# Patient Record
Sex: FEMALE | Race: BLACK OR AFRICAN AMERICAN | Employment: FULL TIME | URBAN - METROPOLITAN AREA
[De-identification: names, ages, dates, MRNs, and addresses within clinical notes are randomized per-mention and may not be internally consistent; named-entity substitution may affect disease eponyms.]

---

## 2017-07-27 ENCOUNTER — HOSPITAL ENCOUNTER (OUTPATIENT)
Dept: ULTRASOUND IMAGING | Age: 27
Discharge: HOME | End: 2017-07-27
Attending: INTERNAL MEDICINE
Payer: COMMERCIAL

## 2017-07-27 ENCOUNTER — ANESTHESIA EVENT (OUTPATIENT)
Dept: ENDOSCOPY | Age: 27
End: 2017-07-27
Payer: COMMERCIAL

## 2017-07-27 DIAGNOSIS — R11.0 NAUSEA: ICD-10-CM

## 2017-07-27 PROCEDURE — 76705 ECHO EXAM OF ABDOMEN: CPT

## 2017-07-27 RX ORDER — HYDROMORPHONE HYDROCHLORIDE 2 MG/ML
0.5 INJECTION, SOLUTION INTRAMUSCULAR; INTRAVENOUS; SUBCUTANEOUS
Status: CANCELLED | OUTPATIENT
Start: 2017-07-27

## 2017-07-27 RX ORDER — OXYCODONE HYDROCHLORIDE 5 MG/1
5 TABLET ORAL
Status: CANCELLED | OUTPATIENT
Start: 2017-07-27

## 2017-07-27 RX ORDER — SODIUM CHLORIDE 0.9 % (FLUSH) 0.9 %
5-10 SYRINGE (ML) INJECTION AS NEEDED
Status: CANCELLED | OUTPATIENT
Start: 2017-07-27

## 2017-07-27 RX ORDER — OXYCODONE AND ACETAMINOPHEN 10; 325 MG/1; MG/1
1 TABLET ORAL AS NEEDED
Status: CANCELLED | OUTPATIENT
Start: 2017-07-27

## 2017-07-28 ENCOUNTER — ANESTHESIA (OUTPATIENT)
Dept: ENDOSCOPY | Age: 27
End: 2017-07-28
Payer: COMMERCIAL

## 2017-07-28 ENCOUNTER — HOSPITAL ENCOUNTER (OUTPATIENT)
Age: 27
Setting detail: OUTPATIENT SURGERY
Discharge: HOME | End: 2017-07-28
Attending: INTERNAL MEDICINE | Admitting: INTERNAL MEDICINE
Payer: COMMERCIAL

## 2017-07-28 VITALS
DIASTOLIC BLOOD PRESSURE: 82 MMHG | HEART RATE: 60 BPM | RESPIRATION RATE: 16 BRPM | OXYGEN SATURATION: 100 % | BODY MASS INDEX: 48.82 KG/M2 | WEIGHT: 293 LBS | SYSTOLIC BLOOD PRESSURE: 128 MMHG | TEMPERATURE: 99.8 F | HEIGHT: 65 IN

## 2017-07-28 PROCEDURE — 76060000031 HC ANESTHESIA FIRST 0.5 HR: Performed by: INTERNAL MEDICINE

## 2017-07-28 PROCEDURE — 77030009426 HC FCPS BIOP ENDOSC BSC -B: Performed by: INTERNAL MEDICINE

## 2017-07-28 PROCEDURE — 74011250636 HC RX REV CODE- 250/636

## 2017-07-28 PROCEDURE — 76040000025: Performed by: INTERNAL MEDICINE

## 2017-07-28 PROCEDURE — 74011250636 HC RX REV CODE- 250/636: Performed by: ANESTHESIOLOGY

## 2017-07-28 PROCEDURE — 88305 TISSUE EXAM BY PATHOLOGIST: CPT | Performed by: INTERNAL MEDICINE

## 2017-07-28 PROCEDURE — 88312 SPECIAL STAINS GROUP 1: CPT | Performed by: INTERNAL MEDICINE

## 2017-07-28 PROCEDURE — 74011000250 HC RX REV CODE- 250

## 2017-07-28 RX ORDER — PROPOFOL 10 MG/ML
INJECTION, EMULSION INTRAVENOUS AS NEEDED
Status: DISCONTINUED | OUTPATIENT
Start: 2017-07-28 | End: 2017-07-28 | Stop reason: HOSPADM

## 2017-07-28 RX ORDER — ONDANSETRON 2 MG/ML
INJECTION INTRAMUSCULAR; INTRAVENOUS AS NEEDED
Status: DISCONTINUED | OUTPATIENT
Start: 2017-07-28 | End: 2017-07-28 | Stop reason: HOSPADM

## 2017-07-28 RX ORDER — PROPOFOL 10 MG/ML
INJECTION, EMULSION INTRAVENOUS
Status: DISCONTINUED | OUTPATIENT
Start: 2017-07-28 | End: 2017-07-28 | Stop reason: HOSPADM

## 2017-07-28 RX ORDER — LIDOCAINE HYDROCHLORIDE 20 MG/ML
INJECTION, SOLUTION EPIDURAL; INFILTRATION; INTRACAUDAL; PERINEURAL AS NEEDED
Status: DISCONTINUED | OUTPATIENT
Start: 2017-07-28 | End: 2017-07-28 | Stop reason: HOSPADM

## 2017-07-28 RX ORDER — SODIUM CHLORIDE, SODIUM LACTATE, POTASSIUM CHLORIDE, CALCIUM CHLORIDE 600; 310; 30; 20 MG/100ML; MG/100ML; MG/100ML; MG/100ML
75 INJECTION, SOLUTION INTRAVENOUS CONTINUOUS
Status: DISCONTINUED | OUTPATIENT
Start: 2017-07-28 | End: 2017-07-28 | Stop reason: HOSPADM

## 2017-07-28 RX ADMIN — ONDANSETRON 4 MG: 2 INJECTION INTRAMUSCULAR; INTRAVENOUS at 08:09

## 2017-07-28 RX ADMIN — LIDOCAINE HYDROCHLORIDE 60 MG: 20 INJECTION, SOLUTION EPIDURAL; INFILTRATION; INTRACAUDAL; PERINEURAL at 07:58

## 2017-07-28 RX ADMIN — PROPOFOL 140 MCG/KG/MIN: 10 INJECTION, EMULSION INTRAVENOUS at 07:58

## 2017-07-28 RX ADMIN — SODIUM CHLORIDE, SODIUM LACTATE, POTASSIUM CHLORIDE, AND CALCIUM CHLORIDE 75 ML/HR: 600; 310; 30; 20 INJECTION, SOLUTION INTRAVENOUS at 07:05

## 2017-07-28 RX ADMIN — PROPOFOL 50 MG: 10 INJECTION, EMULSION INTRAVENOUS at 07:58

## 2017-07-28 NOTE — ANESTHESIA POSTPROCEDURE EVALUATION
Post-Anesthesia Evaluation and Assessment    Patient: Stephania Laughlin MRN: [id number]  SSN: [id number]    YOB: 1990  Age: 24 y.o. Sex: female       Cardiovascular Function/Vital Signs  Visit Vitals    /58    Pulse 78    Temp 37.7 °C (99.8 °F)    Resp 12    Ht 5' 5\" (1.651 m)    Wt 152 kg (335 lb)    SpO2 97%    Breastfeeding No    BMI 55.75 kg/m2       Patient is status post total IV anesthesia anesthesia for Procedure(s):  ESOPHAGOGASTRODUODENOSCOPY (EGD)/ 57  ESOPHAGOGASTRODUODENAL (EGD) BIOPSY. Nausea/Vomiting: None    Postoperative hydration reviewed and adequate. Pain:  Pain Scale 1: Visual (07/28/17 0818)  Pain Intensity 1: 0 (07/28/17 0818)   Managed    Neurological Status: At baseline    Mental Status and Level of Consciousness: Arousable    Pulmonary Status:   O2 Device: Nasal cannula (07/28/17 0819)   Adequate oxygenation and airway patent    Complications related to anesthesia: None    Post-anesthesia assessment completed.  No concerns    Signed By: Erika Degroot MD     July 28, 2017

## 2017-07-28 NOTE — DISCHARGE INSTRUCTIONS
Gastrointestinal Esophagogastroduodenoscopy (EGD) - Upper Exam Discharge Instructions    1. Call Dr. Syd Lowe at [phone] for any problems or questions. 2. Contact the doctor's office for follow up appointment as directed. 3. Medication may cause drowsiness for several hours, therefore, do not drive or  operate machinery for remainder of the day. 4. No alcohol today. 5. Ordinarily, you may resume regular diet and activity after exam unless otherwise  specified by your physician. 6. For mild soreness in your throat you may use Cepacol throat lozenges or warm  salt-water gargles as needed. Any additional instructions:      1. Continue Omeprazole 40 mg a day and try and stop Zantac 150 mg at bedtime. 2. Follow up with Dr. Syd Lowe as needed. Instructions given to Deng Zuniga and other family members.   Instructions given by:  Dale Adan RN

## 2017-07-28 NOTE — H&P
Date of Surgery Update:  Charito Brady was seen and examined. History and physical has been reviewed. The patient has been examined.  There have been no significant clinical changes since the completion of the originally dated History and Physical.  Nausea and emesis    Signed By: Mary Pleitez MD     July 28, 2017 7:51 AM

## 2017-07-28 NOTE — PROGRESS NOTES
Pt discharged via wheelchair by Binh Elias. VSS on room air. Dr. Radha Valdes spoke to pt and family at bedside.

## 2017-07-28 NOTE — PROCEDURES
ESOPHAGOGASTRODUODENOSCOPY    ESOPHAGOGASTRODUODENOSCOPY    DATE of PROCEDURE: 7/28/2017    PT NAME: Stephania Laughlin     [id number]     INDICATION:  N/V, GERD and HB    MEDICATION:   TIVA    INSTRUMENT: GIF  H180    SPECIAL PROCEDURE: None  BLOOD LOSS- 0 or min. SPEC- duodenal, gastric and EG junction    PROCEDURE:  Standard EGD      ASSESSMENT:  1. Mild gastritis    PLAN:   1. Continue Omeprazole 40 mg a day and try and stop Zantac 150 mg at hs. Dietary changes and weight loss to continue--these are helping her sx.  2. F/U prn, unless biopsies indicate otherwise.     Tadeo Calderon MD

## 2017-07-28 NOTE — ANESTHESIA PREPROCEDURE EVALUATION
Anesthetic History   No history of anesthetic complications            Review of Systems / Medical History  Patient summary reviewed and pertinent labs reviewed    Pulmonary  Within defined limits                 Neuro/Psych   Within defined limits           Cardiovascular                  Exercise tolerance: >4 METS     GI/Hepatic/Renal     GERD           Endo/Other        Morbid obesity     Other Findings              Physical Exam    Airway  Mallampati: II  TM Distance: > 6 cm  Neck ROM: normal range of motion   Mouth opening: Normal     Cardiovascular               Dental  No notable dental hx       Pulmonary                 Abdominal         Other Findings            Anesthetic Plan    ASA: 3  Anesthesia type: total IV anesthesia          Induction: Intravenous  Anesthetic plan and risks discussed with: Patient

## 2017-07-28 NOTE — IP AVS SNAPSHOT
[address] [place] [address] 
[phone] Patient: Brittani Solorzano MRN: [id number] MAGALIE:[birth year] You are allergic to the following Allergen Reactions Bactrim (Sulfamethoprim Ds) Hives Sulfa (Sulfonamide Antibiotics) Hives Recent Documentation Height Weight Breastfeeding? BMI OB Status Smoking Status 1.651 m 152 kg No 55.75 kg/m2 Having regular periods Never Smoker Emergency Contacts Name Discharge Info Relation Home Work Mobile Ashley Connor DISCHARGE CAREGIVER [3] Mother [14] [phone] Jt Connor DISCHARGE CAREGIVER [3] Father [15] About your hospitalization You were admitted on:  July 28, 2017 You last received care in the:  [facility] You were discharged on:  July 28, 2017 Unit phone number:  [phone] Why you were hospitalized Your primary diagnosis was:  Not on File Providers Seen During Your Hospitalizations Provider Role Specialty Primary office phone Shahab Lomeli MD Attending Provider Gastroenterology [phone] Your Primary Care Physician (PCP) Primary Care Physician Office Phone Office Fax Bryon Reagan [phone] Follow-up Information None Current Discharge Medication List  
  
ASK your doctor about these medications Dose & Instructions Dispensing Information Comments Morning Noon Evening Bedtime  
 drospirenone-ethinyl estradiol 3-0.02 mg Tab Commonly known as:  Yahaira Steiner (28) Your last dose was: Your next dose is:    
   
   
 Dose:  1 Tab Take 1 Tab by mouth daily. Quantity:  84 Tab Refills:  4 Omeprazole delayed release 20 mg tablet Commonly known as:  PRILOSEC D/R Your last dose was: Your next dose is:    
   
   
 Dose:  20 mg Take 20 mg by mouth daily. Refills:  0 OTHER Your last dose was: Your next dose is:    
   
   
 GI oral unknown name of med Refills:  0  
     
   
   
   
  
 raNITIdine 150 mg tablet Commonly known as:  ZANTAC Your last dose was: Your next dose is:    
   
   
 Dose:  1 Tab Take 1 Tab by mouth two (2) times a day. Refills:  0 Discharge Instructions Gastrointestinal Esophagogastroduodenoscopy (EGD) - Upper Exam Discharge Instructions 1. Call Dr. Panda Jones at [phone] for any problems or questions. 2. Contact the doctor's office for follow up appointment as directed. 3. Medication may cause drowsiness for several hours, therefore, do not drive or  operate machinery for remainder of the day. 4. No alcohol today. 5. Ordinarily, you may resume regular diet and activity after exam unless otherwise  specified by your physician. 6. For mild soreness in your throat you may use Cepacol throat lozenges or warm  salt-water gargles as needed. Any additional instructions: 1. Continue Omeprazole 40 mg a day and try and stop Zantac 150 mg at bedtime. 2. Follow up with Dr. Panda Jones as needed. Instructions given to Stephania Laughlin and other family members. Instructions given by:  Sumi Tripp RN Discharge Orders None Introducing [address]! [employer] [place] [employer]Â® [facility] introduces raksul patient portal. Now you can access parts of your medical record, email your doctor's office, and request medication refills online. 1. In your internet browser, go to [email or web address] 2. Click on the First Time User? Click Here link in the Sign In box. You will see the New Member Sign Up page. 3. Enter your raksul Access Code exactly as it appears below. You will not need to use this code after youve completed the sign-up process. If you do not sign up before the expiration date, you must request a new code. · Transinsight Access Code: [email or web address] Expires: 8/14/2017 10:33 AM 
 
4. Enter the last four digits of your Social Security Number (xxxx) and Date of Birth (mm/dd/yyyy) as indicated and click Submit. You will be taken to the next sign-up page. 5. Create a Transinsight ID. This will be your Transinsight login ID and cannot be changed, so think of one that is secure and easy to remember. 6. Create a Transinsight password. You can change your password at any time. 7. Enter your Password Reset Question and Answer. This can be used at a later time if you forget your password. 8. Enter your e-mail address. You will receive e-mail notification when new information is available in [address] 9. Click Sign Up. You can now view and download portions of your medical record. 10. Click the Download Summary menu link to download a portable copy of your medical information. If you have questions, please visit the Frequently Asked Questions section of the Transinsight website. Remember, Transinsight is NOT to be used for urgent needs. For medical emergencies, dial 911. Now available from your iPhone and Android! General Information Please provide this summary of care documentation to your next provider. Patient Signature:  ____________________________________________________________ Date:  ____________________________________________________________  
  
Jeet Mohamud Provider Signature:  ____________________________________________________________ Date:  ____________________________________________________________

## 2017-09-22 ENCOUNTER — HOSPITAL ENCOUNTER (OUTPATIENT)
Dept: MRI IMAGING | Age: 27
Discharge: HOME | End: 2017-09-22
Attending: INTERNAL MEDICINE
Payer: COMMERCIAL

## 2017-09-22 VITALS — WEIGHT: 283 LBS | BODY MASS INDEX: 47.09 KG/M2

## 2017-09-22 DIAGNOSIS — R93.5 ABNORMAL US (ULTRASOUND) OF ABDOMEN: ICD-10-CM

## 2017-09-22 DIAGNOSIS — R11.0 NAUSEA: ICD-10-CM

## 2017-09-22 PROCEDURE — A9577 INJ MULTIHANCE: HCPCS | Performed by: INTERNAL MEDICINE

## 2017-09-22 PROCEDURE — 74011000258 HC RX REV CODE- 258: Performed by: INTERNAL MEDICINE

## 2017-09-22 PROCEDURE — 74011250636 HC RX REV CODE- 250/636: Performed by: INTERNAL MEDICINE

## 2017-09-22 PROCEDURE — 74183 MRI ABD W/O CNTR FLWD CNTR: CPT

## 2017-09-22 RX ORDER — SODIUM CHLORIDE 0.9 % (FLUSH) 0.9 %
10 SYRINGE (ML) INJECTION
Status: COMPLETED | OUTPATIENT
Start: 2017-09-22 | End: 2017-09-22

## 2017-09-22 RX ADMIN — Medication 10 ML: at 08:17

## 2017-09-22 RX ADMIN — GADOBENATE DIMEGLUMINE 10 ML: 529 INJECTION, SOLUTION INTRAVENOUS at 08:17

## 2017-09-22 RX ADMIN — SODIUM CHLORIDE 100 ML: 900 INJECTION, SOLUTION INTRAVENOUS at 08:17

## 2018-02-07 PROBLEM — E66.01 OBESITY, MORBID (HCC): Status: ACTIVE | Noted: 2018-02-07

## 2019-08-23 PROBLEM — I10 ESSENTIAL HYPERTENSION: Status: ACTIVE | Noted: 2019-08-23

## 2020-05-21 PROBLEM — L30.9 DERMATITIS: Status: ACTIVE | Noted: 2020-05-21

## 2020-05-21 PROBLEM — N76.0 ACUTE VAGINITIS: Status: ACTIVE | Noted: 2020-05-21

## 2022-03-10 PROBLEM — K21.9 GASTROESOPHAGEAL REFLUX DISEASE WITHOUT ESOPHAGITIS: Status: ACTIVE | Noted: 2020-12-02

## 2022-03-10 PROBLEM — G43.909 MIGRAINE: Status: ACTIVE | Noted: 2020-12-02

## 2022-03-10 PROBLEM — R10.2 PELVIC PAIN: Status: ACTIVE | Noted: 2020-12-02

## 2022-03-10 PROBLEM — R03.0 ELEVATED BLOOD PRESSURE READING: Status: ACTIVE | Noted: 2020-12-02

## 2022-03-18 PROBLEM — R03.0 ELEVATED BLOOD PRESSURE READING: Status: ACTIVE | Noted: 2020-12-02

## 2022-03-19 PROBLEM — N76.0 ACUTE VAGINITIS: Status: ACTIVE | Noted: 2020-05-21

## 2022-03-19 PROBLEM — L30.9 DERMATITIS: Status: ACTIVE | Noted: 2020-05-21

## 2022-03-19 PROBLEM — K21.9 GASTROESOPHAGEAL REFLUX DISEASE WITHOUT ESOPHAGITIS: Status: ACTIVE | Noted: 2020-12-02

## 2022-03-19 PROBLEM — R10.2 PELVIC PAIN: Status: ACTIVE | Noted: 2020-12-02

## 2022-03-19 PROBLEM — G43.909 MIGRAINE: Status: ACTIVE | Noted: 2020-12-02

## 2022-03-20 PROBLEM — E66.01 OBESITY, MORBID (HCC): Status: ACTIVE | Noted: 2018-02-07

## 2022-03-20 PROBLEM — I10 ESSENTIAL HYPERTENSION: Status: ACTIVE | Noted: 2019-08-23

## 2022-07-25 ENCOUNTER — OFFICE VISIT (OUTPATIENT)
Dept: OCCUPATIONAL MEDICINE | Age: 32
End: 2022-07-25

## 2022-07-25 DIAGNOSIS — Z20.822 ENCOUNTER FOR SCREENING LABORATORY TESTING FOR COVID-19 VIRUS: Primary | ICD-10-CM

## 2022-07-25 LAB — SARS-COV-2, POC: NORMAL

## 2022-07-25 PROCEDURE — 87426 SARSCOV CORONAVIRUS AG IA: CPT | Performed by: NURSE PRACTITIONER

## 2022-07-25 PROCEDURE — 99212 OFFICE O/P EST SF 10 MIN: CPT | Performed by: NURSE PRACTITIONER

## 2022-07-25 NOTE — PATIENT INSTRUCTIONS
Patient Education        Learning About Coronavirus [phone])  What is coronavirus (COVID-19)? COVID-19 is a disease caused by a type of coronavirus. This illness was firstfound in December 2019. It has since spread worldwide. Coronaviruses are a large group of viruses. They cause the common cold. They also cause more serious illnesses like Middle East respiratory syndrome (MERS) and severe acute respiratory syndrome (SARS). COVID-19 is caused by a novelcoronavirus. That means it's a new type that has not been seen in people before. What are the symptoms? COVID-19 symptoms may include:  Fever. Cough. Trouble breathing. Chills or repeated shaking with chills. Muscle and body aches. Headache. Sore throat. New loss of taste or smell. Vomiting. Diarrhea. In severe cases, COVID-19 can cause pneumonia and make it hard to breathewithout help from a machine. It can cause death. How is it diagnosed? COVID-19 is diagnosed with a viral test. This may also be called a PCR test or antigen test. It looks for evidence of the virus in your breathing passages orlungs (respiratory system). The test is most often done on a sample from the nose, throat, or lungs. It's sometimes done on a sample of saliva. One way a sample is collected is byputting a long swab into the back of your nose. If you have questions about COVID-19 testing, ask your doctor or go to cdc.govto use the COVID-19 Viral Testing Tool. How is it treated? Mild cases of COVID-19 can be treated at home. Serious cases need treatment in the hospital. Treatment may include medicines, plus breathing support such as oxygen therapy or a ventilator. Some people may be placed on their belly tohelp their oxygen levels. Treatments that may help people who have COVID-19 include:  Antiviral medicines. These medicines treat viral infections. Immune-based therapy. These medicines help the immune system fight COVID-19.  Examples include monoclonal where you live. Don't go to school, work, or public areas. Wear a well-fitting mask around other people for a full 10 days. Avoid travel, and stay away from people at high risk for serious illness. Watch for symptoms. If you have been exposed AND either tested positive for COVID-19 in the last 90 days and have recovered or you are up to date on your COVID-19 vaccines:  Talk to your doctor as soon as you can. Your doctor may have you take medicine to help prevent serious illness. Get a COVID-19 test. Wait 5 days after you were last exposed. You may need to be tested more than once. And if your test is positive, follow the instructions below. Wear a well-fitting mask around other people for a full 10 days. Avoid travel and stay away from people at high risk for serious illness. Watch for symptoms. If you tested positive for COVID-19 in the last 90 days and have not recovered, another COVID-19 test may not be needed. If you're sick or test positive for COVID-19:  Talk to your doctor as soon as you can. Your doctor may have you take medicine to help prevent serious illness. Get a COVID-19 test unless you have already been tested. You may need to be tested more than once. Stay home. Leave only if you need to get medical care. If you were seriously ill or if you have a weakened immune system, you may need to isolate for several weeks. For a full 10 days, wear a well-fitting mask whenever you're around other people. Avoid travel and stay away from people at high risk for serious illness. Limit contact with pets and people in your home. If possible, stay in a separate bedroom and use a separate bathroom. Clean and disinfect your home every day. Use household  and disinfectant wipes or sprays. Take special care to clean things that you touch with your hands. How can you self-isolate when you have COVID-19?   If you have COVID-19, there are things you can do to help avoid spreading thevirus to others. Stay home, and avoid contact with other people. Limit contact with people in your home. If possible, stay in a separate bedroom and use a separate bathroom. Wear a well-fitting mask when you are around other people. Avoid contact with pets and other animals. Cover your mouth and nose with a tissue when you cough or sneeze. Then throw it in the trash right away. Wash your hands often, especially after you cough or sneeze. Use soap and water, and scrub for at least 20 seconds. If soap and water aren't available, use an alcohol-based hand . Don't share personal household items. These include bedding, towels, cups and glasses, and eating utensils. [address] in the warmest water allowed for the fabric type, and dry it completely. It's okay to wash other people's laundry with yours. Clean and disinfect your home. Use household  and disinfectant wipes or sprays. If you have questions, visit cdc.gov to check the Quarantine and IsolationCalculator. When should you call for help? Call 911 anytime you think you may need emergency care. For example, call if you have life-threatening symptoms, such as: You have severe trouble breathing. (You can't talk at all.)     You have constant chest pain or pressure. You are severely dizzy or lightheaded. You are confused or can't think clearly. You have pale, gray, or blue-colored skin or lips. You pass out (lose consciousness) or are very hard to wake up. You have loss of balance or trouble walking. You have trouble seeing out of one or both eyes. You have weakness or drooping on one side of the face. You have weakness or numbness in an arm or a leg. You have trouble speaking. You have a severe headache. You have a seizure. Call your doctor now or seek immediate medical care if:    You have moderate trouble breathing. (You can't speak a full sentence.)     You are coughing up blood.      You have signs of low blood pressure. These include feeling lightheaded; being too weak to stand; and having cold, pale, clammy skin. Watch closely for changes in your health, and be sure to contact your doctor if:    Your symptoms get worse. You are not getting better as expected. You have new or worse symptoms of anxiety, depression, nightmares, or flashbacks. Call before you go to the doctor's office. Follow their instructions. And wear a mask. Where can you learn more? Go to [email or web address] org and sign in to your WaveDeck account. Enter C008 in the [employer] box to learn more about \"Learning About Coronavirus (COVID-19). \"     If you do not have an account, please click on the \"Sign Up Now\" link. Current as of: May 28, 2022               Content Version: 13.3  © 5989-8620 Healthwise, Incorporated. Care instructions adapted under license by [facility] ([facility]). If you have questions about a medical condition or this instruction, always ask your healthcare professional. [address] any warranty or liability for your use of this information.

## 2022-07-25 NOTE — PROGRESS NOTES
Rapid COVID testing performed today as requested by patient's employer. Discussed that HR notification of result is necessary for medical surveillance during a pandemic state  in order to complete proper disinfection techniques and contact tracing processes if results are positive, and if results are negative they should remain vigilant in monitoring for symptom development as additional testing may be warranted. Patient consented to testing today (results below) and COVID educational handout provided. RTC as needed for complaints or concerns that may arise.      Results for orders placed or performed in visit on 07/25/22   AMB POC SARS-COV-2   Result Value Ref Range    SARS-COV-2, POC Not-Detected Not Detected       Johana Toro, APRN - CNP

## 2022-08-24 ENCOUNTER — OFFICE VISIT (OUTPATIENT)
Dept: OCCUPATIONAL MEDICINE | Age: 32
End: 2022-08-24

## 2022-08-24 DIAGNOSIS — Z20.822 ENCOUNTER FOR SCREENING LABORATORY TESTING FOR COVID-19 VIRUS: Primary | ICD-10-CM

## 2022-08-24 LAB — SARS-COV-2, POC: NORMAL

## 2022-08-24 PROCEDURE — 99212 OFFICE O/P EST SF 10 MIN: CPT | Performed by: NURSE PRACTITIONER

## 2022-08-24 PROCEDURE — 87426 SARSCOV CORONAVIRUS AG IA: CPT | Performed by: NURSE PRACTITIONER

## 2022-08-24 NOTE — PROGRESS NOTES
Rapid COVID testing performed today as requested by patient's employer. HR notification of result is necessary for medical surveillance during a pandemic state  in order to complete proper disinfection techniques and contact tracing processes if results are positive, and if results are negative they should remain vigilant in monitoring for symptom development as additional testing may be warranted. Patient consented to testing today (results below) and COVID educational handout provided. RTC as needed for complaints or concerns that may arise.      Results for orders placed or performed in visit on 08/24/22   AMB POC SARS-COV-2   Result Value Ref Range    SARS-COV-2, POC Not-Detected Not Detected       Brandie Hankins, APRN - CNP

## 2023-08-23 ENCOUNTER — OFFICE VISIT (OUTPATIENT)
Dept: OBGYN CLINIC | Age: 33
End: 2023-08-23

## 2023-08-23 VITALS
HEIGHT: 65 IN | DIASTOLIC BLOOD PRESSURE: 78 MMHG | WEIGHT: 293 LBS | BODY MASS INDEX: 48.82 KG/M2 | SYSTOLIC BLOOD PRESSURE: 132 MMHG

## 2023-08-23 DIAGNOSIS — N64.9 BREAST LESION: ICD-10-CM

## 2023-08-23 DIAGNOSIS — E66.9 OBESITY (BMI 30.0-34.9): ICD-10-CM

## 2023-08-23 DIAGNOSIS — A59.9 TRICHOMONOSIS: Primary | ICD-10-CM

## 2023-08-23 PROCEDURE — 3078F DIAST BP <80 MM HG: CPT | Performed by: OBSTETRICS & GYNECOLOGY

## 2023-08-23 PROCEDURE — 3075F SYST BP GE 130 - 139MM HG: CPT | Performed by: OBSTETRICS & GYNECOLOGY

## 2023-08-23 PROCEDURE — 99213 OFFICE O/P EST LOW 20 MIN: CPT | Performed by: OBSTETRICS & GYNECOLOGY

## 2023-08-23 RX ORDER — LOSARTAN POTASSIUM AND HYDROCHLOROTHIAZIDE 12.5; 5 MG/1; MG/1
1 TABLET ORAL DAILY
COMMUNITY
Start: 2023-07-07

## 2023-08-23 RX ORDER — PHENTERMINE HYDROCHLORIDE 37.5 MG/1
37.5 TABLET ORAL DAILY
COMMUNITY
Start: 2023-08-04

## 2023-08-23 RX ORDER — MELOXICAM 15 MG/1
TABLET ORAL
COMMUNITY
Start: 2023-06-18

## 2023-08-23 ASSESSMENT — ENCOUNTER SYMPTOMS
ALLERGIC/IMMUNOLOGIC NEGATIVE: 1
EYES NEGATIVE: 1
RESPIRATORY NEGATIVE: 1
GASTROINTESTINAL NEGATIVE: 1

## 2023-08-25 LAB
A VAGINAE DNA VAG QL NAA+PROBE: NORMAL SCORE
BVAB2 DNA VAG QL NAA+PROBE: NORMAL SCORE
C ALBICANS DNA VAG QL NAA+PROBE: NEGATIVE
C GLABRATA DNA VAG QL NAA+PROBE: NEGATIVE
C TRACH RRNA SPEC QL NAA+PROBE: NEGATIVE
MEGA1 DNA VAG QL NAA+PROBE: NORMAL SCORE
N GONORRHOEA RRNA SPEC QL NAA+PROBE: NEGATIVE
SPECIMEN SOURCE: NORMAL
T VAGINALIS RRNA SPEC QL NAA+PROBE: NEGATIVE

## 2023-10-05 ENCOUNTER — OFFICE VISIT (OUTPATIENT)
Dept: OCCUPATIONAL MEDICINE | Age: 33
End: 2023-10-05

## 2023-10-05 VITALS
SYSTOLIC BLOOD PRESSURE: 124 MMHG | HEART RATE: 98 BPM | WEIGHT: 293 LBS | BODY MASS INDEX: 48.82 KG/M2 | DIASTOLIC BLOOD PRESSURE: 90 MMHG | HEIGHT: 65 IN

## 2023-10-05 DIAGNOSIS — Z00.8 ENCOUNTER FOR BIOMETRIC SCREENING: Primary | ICD-10-CM

## 2023-10-05 PROBLEM — N76.0 ACUTE VAGINITIS: Status: RESOLVED | Noted: 2020-05-21 | Resolved: 2023-10-05

## 2023-10-05 PROBLEM — G43.701 CHRONIC MIGRAINE WITHOUT AURA WITH STATUS MIGRAINOSUS, NOT INTRACTABLE: Status: ACTIVE | Noted: 2023-06-23

## 2023-10-05 PROBLEM — J30.1 SEASONAL ALLERGIC RHINITIS DUE TO POLLEN: Status: ACTIVE | Noted: 2023-06-23

## 2023-10-05 PROBLEM — I10 ESSENTIAL HYPERTENSION: Status: ACTIVE | Noted: 2019-08-23

## 2023-10-05 PROBLEM — G43.909 MIGRAINE: Status: ACTIVE | Noted: 2020-12-02

## 2023-10-05 PROBLEM — R03.0 ELEVATED BLOOD PRESSURE READING: Status: ACTIVE | Noted: 2020-12-02

## 2023-10-05 PROBLEM — R10.2 PELVIC PAIN: Status: ACTIVE | Noted: 2020-12-02

## 2023-10-05 PROBLEM — L30.9 DERMATITIS: Status: ACTIVE | Noted: 2020-05-21

## 2023-10-05 PROBLEM — E66.01 MORBID OBESITY (HCC): Status: ACTIVE | Noted: 2018-02-07

## 2023-10-06 LAB
CHOLEST SERPL-MCNC: 156 MG/DL (ref 100–199)
CHOLEST/HDLC SERPL: 3.8 RATIO (ref 0–4.4)
GLUCOSE SERPL-MCNC: 78 MG/DL (ref 70–99)
HBA1C MFR BLD: 5.1 % (ref 4.8–5.6)
HDLC SERPL-MCNC: 41 MG/DL
LDLC SERPL CALC-MCNC: 103 MG/DL (ref 0–99)
TRIGL SERPL-MCNC: 57 MG/DL (ref 0–149)
VLDLC SERPL CALC-MCNC: 12 MG/DL (ref 5–40)

## 2024-02-27 ENCOUNTER — OFFICE VISIT (OUTPATIENT)
Age: 34
End: 2024-02-27

## 2024-02-27 VITALS — WEIGHT: 293 LBS | BODY MASS INDEX: 50.09 KG/M2

## 2024-02-27 DIAGNOSIS — L30.9 DERMATITIS: Primary | ICD-10-CM

## 2024-02-27 ASSESSMENT — ENCOUNTER SYMPTOMS
SORE THROAT: 0
EYE PAIN: 0
RHINORRHEA: 0
COUGH: 0
DIARRHEA: 0
VOMITING: 0
SHORTNESS OF BREATH: 0
SINUS PRESSURE: 0
NAIL CHANGES: 0
CHEST TIGHTNESS: 0
EYE REDNESS: 0
NAUSEA: 0
SINUS PAIN: 0
EYE ITCHING: 0
EYE DISCHARGE: 0

## 2024-02-27 NOTE — PROGRESS NOTES
PROGRESS NOTE    SUBJECTIVE:   Ewa Sutherland is a 33 y.o. female seen for recurrent rash to the backs of her hands and wrist for past 2-3 weeks. Reports she has used OTC benadryl and her triamcinolone for her eczema which does improve the rash.    Chief Complaint    Rash         Rash  This is a recurrent problem. The current episode started 1 to 4 weeks ago. The problem has been waxing and waning since onset. The affected locations include the left hand, left wrist, right hand and right wrist. The rash is characterized by itchiness. 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 Pertinent negatives include no anorexia, congestion, cough, diarrhea, eye pain, facial edema, fatigue, fever, joint pain, nail changes, rhinorrhea, shortness of breath, sore throat or vomiting. Past treatments include topical steroids and antihistamine. The treatment provided moderate relief. Her past medical history is significant for allergies and eczema.       Current Outpatient Medications   Medication Sig Dispense Refill    meloxicam (MOBIC) 15 MG tablet       losartan-hydroCHLOROthiazide (HYZAAR) 50-12.5 MG per tablet Take 1 tablet by mouth daily      phentermine (ADIPEX-P) 37.5 MG tablet Take 1 tablet by mouth daily.      ibuprofen (ADVIL;MOTRIN) 800 MG tablet Take 1 tablet by mouth every 6 hours as needed      omeprazole (PRILOSEC) 40 MG delayed release capsule Take 1 capsule by mouth daily       No current facility-administered medications for this visit.      Allergies   Allergen Reactions    Sulfa Antibiotics Hives       Social History     Tobacco Use    Smoking status: Never    Smokeless tobacco: Never   Vaping Use    Vaping Use: Never used   Substance Use Topics    Alcohol use: Yes     Comment: social    Drug use: No        Review of Systems   Constitutional:  Negative for chills, fatigue and fever.   HENT:  Negative for congestion, ear pain, postnasal

## 2024-03-15 ENCOUNTER — OFFICE VISIT (OUTPATIENT)
Dept: OBGYN CLINIC | Age: 34
End: 2024-03-15
Payer: COMMERCIAL

## 2024-03-15 VITALS
BODY MASS INDEX: 48.48 KG/M2 | WEIGHT: 291 LBS | HEIGHT: 65 IN | DIASTOLIC BLOOD PRESSURE: 90 MMHG | SYSTOLIC BLOOD PRESSURE: 134 MMHG

## 2024-03-15 DIAGNOSIS — N94.9 VAGINAL DISCOMFORT: Primary | ICD-10-CM

## 2024-03-15 DIAGNOSIS — B96.89 BACTERIAL VAGINOSIS: ICD-10-CM

## 2024-03-15 DIAGNOSIS — Z11.3 SCREENING FOR STDS (SEXUALLY TRANSMITTED DISEASES): ICD-10-CM

## 2024-03-15 DIAGNOSIS — Z11.8 SCREENING EXAMINATION FOR PARASITIC INFECTION: ICD-10-CM

## 2024-03-15 DIAGNOSIS — N89.8 VAGINAL IRRITATION: ICD-10-CM

## 2024-03-15 DIAGNOSIS — N76.0 BACTERIAL VAGINOSIS: ICD-10-CM

## 2024-03-15 LAB
BILIRUBIN, URINE, POC: NORMAL
BLOOD URINE, POC: NORMAL
GLUCOSE URINE, POC: NORMAL
KETONES, URINE, POC: NORMAL
LEUKOCYTE ESTERASE, URINE, POC: NORMAL
NITRITE, URINE, POC: NEGATIVE
PH, URINE, POC: 7 (ref 4.6–8)
PROTEIN,URINE, POC: NORMAL
SPECIFIC GRAVITY, URINE, POC: 1.01 (ref 1–1.03)
URINALYSIS CLARITY, POC: CLEAR
URINALYSIS COLOR, POC: YELLOW
UROBILINOGEN, POC: NORMAL

## 2024-03-15 PROCEDURE — 99459 PELVIC EXAMINATION: CPT | Performed by: NURSE PRACTITIONER

## 2024-03-15 PROCEDURE — 81001 URINALYSIS AUTO W/SCOPE: CPT | Performed by: NURSE PRACTITIONER

## 2024-03-15 PROCEDURE — 99214 OFFICE O/P EST MOD 30 MIN: CPT | Performed by: NURSE PRACTITIONER

## 2024-03-15 PROCEDURE — 3075F SYST BP GE 130 - 139MM HG: CPT | Performed by: NURSE PRACTITIONER

## 2024-03-15 PROCEDURE — 3080F DIAST BP >= 90 MM HG: CPT | Performed by: NURSE PRACTITIONER

## 2024-03-15 RX ORDER — PREDNISONE 20 MG/1
TABLET ORAL
COMMUNITY
Start: 2024-02-27

## 2024-03-15 RX ORDER — METRONIDAZOLE 500 MG/1
500 TABLET ORAL 2 TIMES DAILY
Qty: 14 TABLET | Refills: 0 | Status: SHIPPED | OUTPATIENT
Start: 2024-03-15 | End: 2024-03-22

## 2024-03-15 NOTE — PROGRESS NOTES
management. BP reading today: 134/90. She denies any HA, CP or SOB. Strict precautions discussed with patient and she is aware when to seek emergent care if needed. Will continue to follow up with pcp for management.      Diagnosis Orders   1. Vaginal discomfort  AMB POC URINALYSIS DIP STICK AUTO W/ MICRO    Nuswab Vaginitis Plus (VG+)    Culture, Urine    Culture, Urine    Nuswab Vaginitis Plus (VG+)      2. Vaginal irritation  AMB POC URINALYSIS DIP STICK AUTO W/ MICRO    Nuswab Vaginitis Plus (VG+)    Culture, Urine    Culture, Urine    Nuswab Vaginitis Plus (VG+)      3. Screening for STDs (sexually transmitted diseases)  Nuswab Vaginitis Plus (VG+)    Nuswab Vaginitis Plus (VG+)      4. Screening examination for parasitic infection  Nuswab Vaginitis Plus (VG+)    Nuswab Vaginitis Plus (VG+)      5. Bacterial vaginosis  metroNIDAZOLE (FLAGYL) 500 MG tablet          No problem-specific Assessment & Plan notes found for this encounter.         Orders Placed This Encounter   Procedures    Nuswab Vaginitis Plus (VG+)     Standing Status:   Future     Number of Occurrences:   1     Standing Expiration Date:   3/15/2025    Culture, Urine     Standing Status:   Future     Number of Occurrences:   1     Standing Expiration Date:   3/15/2025     Order Specific Question:   Specify (ex-cath, midstream, cysto, etc)?     Answer:   midstream    AMB POC URINALYSIS DIP STICK AUTO W/ MICRO           EVONNE David NP

## 2024-03-17 LAB
BACTERIA SPEC CULT: ABNORMAL
SERVICE CMNT-IMP: ABNORMAL

## 2024-05-03 ENCOUNTER — OFFICE VISIT (OUTPATIENT)
Dept: OBGYN CLINIC | Age: 34
End: 2024-05-03

## 2024-05-03 VITALS
BODY MASS INDEX: 47.98 KG/M2 | SYSTOLIC BLOOD PRESSURE: 132 MMHG | HEIGHT: 65 IN | DIASTOLIC BLOOD PRESSURE: 90 MMHG | WEIGHT: 288 LBS

## 2024-05-03 DIAGNOSIS — N94.9 VAGINAL DISCOMFORT: Primary | ICD-10-CM

## 2024-05-03 DIAGNOSIS — N89.8 VAGINAL IRRITATION: ICD-10-CM

## 2024-05-03 DIAGNOSIS — R10.30 LOWER ABDOMINAL PAIN: ICD-10-CM

## 2024-05-03 NOTE — PROGRESS NOTES
the Dove bar soap and noticed that did decrease symptoms.\" States instead of feeling discomfort daily, she now experiences every other day. She also reports experiencing continuing bilateral lower abdominal cramping since February. She reports sitting down for long periods of time will intensify abdominal cramping and lying down does assist cramping. She reports feeling occasional sharp pains to abdomen however, \"these have been random and I have maybe felt 3 times since February.\" Patient states she did start taking Culturelle probiotic over the last few weeks which did assist cramping \"some.\" She denies fevers, chills,constipation, abnormal vaginal discharge, itching, odor, urinary frequency, urgency or dysuria today.    -Offered Nuswab test again today however, patient declined.    -External pelvic exam findings: No lesions or abnormalities noted on exam today.    -Discussed exam findings with patient today with no concerns for abnormalities to vagina on exam.    -Had lengthy discussion with patient that feeling to entrance of vaginal could be due to sensitivity as when patient changed from body wash to bar soap, the symptoms improved. Also recommended ensuring any sexual devices she may use be cleaned properly prior to each use and condoms if used be latex free to decrease possible irritation. Also discussed daily lower abdominal cramping with recommendations of obtaining TVUS to ensure no abnormal findings seen. Will have her see Dr. Howell after ultrasound collection to discuss. She is agreeable with this.    -Will plan to RTO for TVUS and f/u findings with Dr. Howell.     Healthy vulval hygiene practices:       AVOID:    Pantyhose    Synthetic underwear   Jeans and other tight pants   Swimsuits, leotards, thongs, lycra garments   Panty liners   Scented soaps or shampoos   Bubble bath   Scented detergents   Washcloths   Baby wipes or flushable wipes   Feminine sprays, douches, powders   Dyed toilet articles

## 2024-05-28 ENCOUNTER — PATIENT MESSAGE (OUTPATIENT)
Dept: OBGYN CLINIC | Age: 34
End: 2024-05-28

## 2024-05-29 ENCOUNTER — OFFICE VISIT (OUTPATIENT)
Dept: OBGYN CLINIC | Age: 34
End: 2024-05-29
Payer: COMMERCIAL

## 2024-05-29 ENCOUNTER — PROCEDURE VISIT (OUTPATIENT)
Dept: OBGYN CLINIC | Age: 34
End: 2024-05-29
Payer: COMMERCIAL

## 2024-05-29 VITALS
BODY MASS INDEX: 48.32 KG/M2 | WEIGHT: 290 LBS | HEIGHT: 65 IN | SYSTOLIC BLOOD PRESSURE: 126 MMHG | DIASTOLIC BLOOD PRESSURE: 88 MMHG

## 2024-05-29 DIAGNOSIS — N83.8 OVARIAN MASS, LEFT: Primary | ICD-10-CM

## 2024-05-29 DIAGNOSIS — N94.9 VAGINAL DISCOMFORT: Primary | ICD-10-CM

## 2024-05-29 PROCEDURE — 99213 OFFICE O/P EST LOW 20 MIN: CPT | Performed by: OBSTETRICS & GYNECOLOGY

## 2024-05-29 PROCEDURE — 76830 TRANSVAGINAL US NON-OB: CPT | Performed by: OBSTETRICS & GYNECOLOGY

## 2024-05-29 PROCEDURE — 3074F SYST BP LT 130 MM HG: CPT | Performed by: OBSTETRICS & GYNECOLOGY

## 2024-05-29 PROCEDURE — 3079F DIAST BP 80-89 MM HG: CPT | Performed by: OBSTETRICS & GYNECOLOGY

## 2024-05-29 ASSESSMENT — ENCOUNTER SYMPTOMS
ALLERGIC/IMMUNOLOGIC NEGATIVE: 1
EYES NEGATIVE: 1
GASTROINTESTINAL NEGATIVE: 1
RESPIRATORY NEGATIVE: 1

## 2024-05-29 NOTE — PROGRESS NOTES
Name: Ewa Sutherland  Date: 2024  YOB: 1990  LMP: Patient's last menstrual period was 2024 (exact date).      Ewa is a 33 y.o.   who is here for a problem visit for pelvic pain/US . Pain resolved    Ewa  reports being sexually active and has had partner(s) who are male. She reports using the following method of birth control/protection: Condom.     Review of Systems   Constitutional: Negative.    HENT: Negative.     Eyes: Negative.    Respiratory: Negative.     Cardiovascular: Negative.    Gastrointestinal: Negative.    Endocrine: Negative.    Genitourinary: Negative.    Musculoskeletal: Negative.    Skin: Negative.    Allergic/Immunologic: Negative.    Hematological: Negative.    Psychiatric/Behavioral: Negative.  Negative for self-injury and suicidal ideas.        Menstrual status: regular cycles  Gyn Surgery: none    Pap History:  Diagnosis:   Date Value Ref Range Status   2019 Comment  Final     Comment:     NEGATIVE FOR INTRAEPITHELIAL LESION OR MALIGNANCY.   2018 Comment  Final     Comment:     NEGATIVE FOR INTRAEPITHELIAL LESION AND MALIGNANCY.   2018 Comment  Final     Comment:     NEGATIVE FOR INTRAEPITHELIAL LESION AND MALIGNANCY.  THIS SPECIMEN WAS RESCREENED AS PART OF OUR  PROGRAM.          No specialty comments available.    Past Medical History:  No date: Abnormal Papanicolaou smear of cervix  No date: Anemia  No date: GERD (gastroesophageal reflux disease)      Comment:  severe  No date: Morbid obesity (HCC)     Past Surgical History:  No date: [place] UNLISTED PROCEDURE ABDOMEN PERITONEUM & OMENTUM      Comment:  gallbladder rx  No date: WISDOM TOOTH EXTRACTION       Current Outpatient Medications:     meloxicam (MOBIC) 15 MG tablet, , Disp: , Rfl:     losartan-hydroCHLOROthiazide (HYZAAR) 50-12.5 MG per tablet, Take 1 tablet by mouth daily, Disp: , Rfl:     phentermine (ADIPEX-P) 37.5 MG tablet, Take 1 tablet by mouth daily., Disp: ,  eyeglasses

## 2024-08-07 ENCOUNTER — OFFICE VISIT (OUTPATIENT)
Dept: OBGYN CLINIC | Age: 34
End: 2024-08-07
Payer: COMMERCIAL

## 2024-08-07 ENCOUNTER — PROCEDURE VISIT (OUTPATIENT)
Dept: OBGYN CLINIC | Age: 34
End: 2024-08-07
Payer: COMMERCIAL

## 2024-08-07 VITALS — BODY MASS INDEX: 48.84 KG/M2 | WEIGHT: 289 LBS | DIASTOLIC BLOOD PRESSURE: 82 MMHG | SYSTOLIC BLOOD PRESSURE: 140 MMHG

## 2024-08-07 DIAGNOSIS — E66.01 MORBID OBESITY (HCC): ICD-10-CM

## 2024-08-07 DIAGNOSIS — N83.8 OVARIAN MASS, LEFT: Primary | ICD-10-CM

## 2024-08-07 DIAGNOSIS — R93.5 ABNORMAL ULTRASOUND OF OVARY: Primary | ICD-10-CM

## 2024-08-07 DIAGNOSIS — E66.9 OBESITY (BMI 30.0-34.9): ICD-10-CM

## 2024-08-07 PROCEDURE — 3079F DIAST BP 80-89 MM HG: CPT | Performed by: OBSTETRICS & GYNECOLOGY

## 2024-08-07 PROCEDURE — 99213 OFFICE O/P EST LOW 20 MIN: CPT | Performed by: OBSTETRICS & GYNECOLOGY

## 2024-08-07 PROCEDURE — 3077F SYST BP >= 140 MM HG: CPT | Performed by: OBSTETRICS & GYNECOLOGY

## 2024-08-07 PROCEDURE — 76830 TRANSVAGINAL US NON-OB: CPT | Performed by: OBSTETRICS & GYNECOLOGY

## 2024-08-07 ASSESSMENT — ENCOUNTER SYMPTOMS
GASTROINTESTINAL NEGATIVE: 1
RESPIRATORY NEGATIVE: 1
EYES NEGATIVE: 1
ALLERGIC/IMMUNOLOGIC NEGATIVE: 1

## 2024-08-13 ENCOUNTER — OFFICE VISIT (OUTPATIENT)
Age: 34
End: 2024-08-13

## 2024-08-13 VITALS
OXYGEN SATURATION: 98 % | RESPIRATION RATE: 16 BRPM | SYSTOLIC BLOOD PRESSURE: 124 MMHG | HEART RATE: 87 BPM | DIASTOLIC BLOOD PRESSURE: 88 MMHG | TEMPERATURE: 98.6 F

## 2024-08-13 DIAGNOSIS — J02.0 STREP PHARYNGITIS: Primary | ICD-10-CM

## 2024-08-13 DIAGNOSIS — J02.9 SORE THROAT: ICD-10-CM

## 2024-08-13 LAB
GROUP A STREP ANTIGEN, POC: POSITIVE
INFLUENZA A ANTIGEN, POC: NEGATIVE
INFLUENZA B ANTIGEN, POC: NEGATIVE
SARS-COV-2 RNA, POC: NEGATIVE
VALID INTERNAL CONTROL, POC: YES

## 2024-08-13 RX ORDER — AMOXICILLIN 500 MG/1
500 CAPSULE ORAL 2 TIMES DAILY
Qty: 20 CAPSULE | Refills: 0 | Status: SHIPPED | OUTPATIENT
Start: 2024-08-13 | End: 2024-08-23

## 2024-08-13 ASSESSMENT — ENCOUNTER SYMPTOMS
SORE THROAT: 1
NAUSEA: 1
VOMITING: 0
DIARRHEA: 0
VISUAL CHANGE: 0
ABDOMINAL PAIN: 0
SINUS PRESSURE: 0
EYE REDNESS: 0
RHINORRHEA: 0
COUGH: 0
EYE PAIN: 0
SINUS PAIN: 0
EYE DISCHARGE: 0
SHORTNESS OF BREATH: 0
EYE ITCHING: 0
CHANGE IN BOWEL HABIT: 0
SWOLLEN GLANDS: 1
CHEST TIGHTNESS: 0

## 2024-08-13 NOTE — PROGRESS NOTES
days    Sore throat  Comments:  Encouraged rest, hydration, and OTC therapies per packaging for relief. RTC as needed  Orders:  -     AMB POC RAPID STREP A  -     AMB POC SARS-COV-2 AND INFLUENZA A/B        Counseled on benefits of having a primary care provider which includes, but is not limited to, continuity of care and having a medical home when concerns arise. Also enforced that onsite clinic policy states that we are not to take the place of a primary care provider, pt verbalized understanding.     SEs and risk vs benefits associated with medications prescribed discussed with patient who verbalized understanding. Pt verbalized understanding and agreement with plan of care. RTC for persisting/worsening symptoms or new complaints that arise. Discussed signs and symptoms that would warrant immediate evaluation including, but not limited to HA, blurred vision, speech disturbance, difficulty with ambulation/gait, numbness, tingling, weakness, syncope, chest pain, or shortness of breath.    I have reviewed the patient's medication list, past medical, family, social, and surgical history in detail and updated the patient record appropriately.    Ra García, APRN - CNP

## 2024-10-28 NOTE — PROGRESS NOTES
PROGRESS NOTE    SUBJECTIVE:   Ewa Sutherland is a 34 y.o. female seen in the employer based health center located at [facility] for employment physical for annual employer Health Risk Assessment and biometric screening as required to obtain their insurance premium discounts.  No complaints or concerns at this time.     Chief Complaint    Health Assessment           OBJECTIVE:  BP (!) 142/82 (Site: Left Upper Arm, Position: Sitting, Cuff Size: Large Adult)   Pulse 74   Ht 1.632 m (5' 4.25\")   Wt (!) 142.7 kg (314 lb 9.6 oz)   BMI 53.58 kg/m²    Additional Measurements    10/31/24 0712   Waist (Inches): 52 in        Physical Exam  Vitals reviewed.   Constitutional:       General: She is awake. She is not in acute distress.     Appearance: Normal appearance. She is well-developed and well-groomed.      Comments: Successful venipuncture of the L hand performed, pt tolerated procedure well   Cardiovascular:      Rate and Rhythm: Normal rate and regular rhythm.      Heart sounds: Normal heart sounds.   Pulmonary:      Effort: Pulmonary effort is normal.   Neurological:      Mental Status: She is alert.   Psychiatric:         Behavior: Behavior is cooperative.       ASSESSMENT and PLAN    Ewa was seen today for health assessment.    Diagnoses and all orders for this visit:    Encounter for biometric screening  -     Glucose, Random  -     Lipid Panel W/ Chol/HDL Ratio  -     Hemoglobin A1C  -     COLLECTION VENOUS BLOOD,VENIPUNCTURE        Discussed HR notification process for credits and that I will contact with results when available    Counseled on benefits of having a primary care provider which includes, but is not limited to, continuity of care and having a medical home when concerns arise. Also enforced that onsite clinic policy states that we are not to take the place of a primary care provider, pt verbalized understanding.     I have reviewed the patient's medication list, past medical,

## 2024-10-31 ENCOUNTER — OFFICE VISIT (OUTPATIENT)
Age: 34
End: 2024-10-31

## 2024-10-31 VITALS
BODY MASS INDEX: 50.02 KG/M2 | HEART RATE: 74 BPM | HEIGHT: 64 IN | DIASTOLIC BLOOD PRESSURE: 82 MMHG | WEIGHT: 293 LBS | SYSTOLIC BLOOD PRESSURE: 142 MMHG

## 2024-10-31 DIAGNOSIS — Z00.8 ENCOUNTER FOR BIOMETRIC SCREENING: Primary | ICD-10-CM

## 2024-10-31 PROBLEM — R60.0 BILATERAL LEG EDEMA: Status: ACTIVE | Noted: 2024-07-12

## 2024-10-31 PROBLEM — G43.009 MIGRAINE WITHOUT AURA AND WITHOUT STATUS MIGRAINOSUS, NOT INTRACTABLE: Status: ACTIVE | Noted: 2020-12-02

## 2024-10-31 PROBLEM — N76.0 ACUTE VAGINITIS: Status: ACTIVE | Noted: 2020-05-21

## 2024-10-31 PROBLEM — E78.00 PURE HYPERCHOLESTEROLEMIA: Status: ACTIVE | Noted: 2024-01-12

## 2024-11-01 LAB
CHOLEST SERPL-MCNC: 151 MG/DL (ref 100–199)
CHOLEST/HDLC SERPL: 2.6 RATIO (ref 0–4.4)
GLUCOSE SERPL-MCNC: 75 MG/DL (ref 70–99)
HBA1C MFR BLD: 5.1 % (ref 4.8–5.6)
HDLC SERPL-MCNC: 57 MG/DL
LDLC SERPL CALC-MCNC: 84 MG/DL (ref 0–99)
TRIGL SERPL-MCNC: 43 MG/DL (ref 0–149)
VLDLC SERPL CALC-MCNC: 10 MG/DL (ref 5–40)

## 2025-03-25 NOTE — PROGRESS NOTES
Cardiology
Name: Daniel Keys  Date: 8/23/2023  YOB: 1990  LMP: Patient's last menstrual period was 08/11/2023. Kamille Chang is a 34 y.o. G0  who is here for a problem visit for f/u trich, \"tingling in cervix\", vaginal odor, pelvic discomfort , and bump under left breast. She was dx with trich on her pap. She had trich 4 years ago and has been fine since with a great partner. She also reports she is working hard to get healthier. She is eating so much better and exercising. Birth control:  none  Menstrual status: regular cycles  Gyn Surgery: none    Health Maintenance:  Pap Smear: last pap in  7/21/23  WNL/+trich  HPV vaccine: not done  If 36 or older, Mammo: never done  If 39 or older, Colonoscopy: not needed, age less than 39  If postmenopausal, Dexa scan: not needed    Review of Systems   Constitutional: Negative. HENT: Negative. Eyes: Negative. Respiratory: Negative. Cardiovascular: Negative. Gastrointestinal: Negative. Endocrine: Negative. Genitourinary: Negative. Musculoskeletal: Negative. Skin: Negative. Allergic/Immunologic: Negative. Hematological: Negative. Psychiatric/Behavioral: Negative. Negative for self-injury and suicidal ideas. Past Medical History:  No date: Abnormal Papanicolaou smear of cervix  No date: Anemia  No date: GERD (gastroesophageal reflux disease)      Comment:  severe  No date: Morbid obesity ([address])     Past Surgical History:  No date: [place] UNLISTED PROCEDURE ABDOMEN PERITONEUM & OMENTUM      Comment:  gallbladder rx  No date: WISDOM TOOTH EXTRACTION       Current Outpatient Medications:     meloxicam (MOBIC) 15 MG tablet, , Disp: , Rfl:     losartan-hydroCHLOROthiazide (HYZAAR) 50-12.5 MG per tablet, Take 1 tablet by mouth daily, Disp: , Rfl:     phentermine (ADIPEX-P) 37.5 MG tablet, Take 1 tablet by mouth daily. , Disp: , Rfl:     ibuprofen (ADVIL;MOTRIN) 800 MG tablet, Take 1 tablet by mouth every 6 hours as needed, Disp: ,
cognition

## 2025-07-20 ENCOUNTER — HOSPITAL ENCOUNTER (EMERGENCY)
Age: 35
Discharge: HOME | End: 2025-07-20
Payer: COMMERCIAL

## 2025-07-20 VITALS
HEART RATE: 66 BPM | RESPIRATION RATE: 14 BRPM | TEMPERATURE: 98.7 F | DIASTOLIC BLOOD PRESSURE: 89 MMHG | WEIGHT: 293 LBS | HEIGHT: 65 IN | OXYGEN SATURATION: 100 % | BODY MASS INDEX: 48.82 KG/M2 | SYSTOLIC BLOOD PRESSURE: 142 MMHG

## 2025-07-20 DIAGNOSIS — G43.909 MIGRAINE WITHOUT STATUS MIGRAINOSUS, NOT INTRACTABLE, UNSPECIFIED MIGRAINE TYPE: Primary | ICD-10-CM

## 2025-07-20 PROCEDURE — 96372 THER/PROPH/DIAG INJ SC/IM: CPT

## 2025-07-20 PROCEDURE — 6360000002 HC RX W HCPCS: Performed by: PHYSICIAN ASSISTANT

## 2025-07-20 PROCEDURE — 99284 EMERGENCY DEPT VISIT MOD MDM: CPT

## 2025-07-20 PROCEDURE — 6370000000 HC RX 637 (ALT 250 FOR IP): Performed by: PHYSICIAN ASSISTANT

## 2025-07-20 RX ORDER — ONDANSETRON 4 MG/1
4 TABLET, ORALLY DISINTEGRATING ORAL
Status: COMPLETED | OUTPATIENT
Start: 2025-07-20 | End: 2025-07-20

## 2025-07-20 RX ORDER — BUTALBITAL, ACETAMINOPHEN AND CAFFEINE 50; 325; 40 MG/1; MG/1; MG/1
1 TABLET ORAL
Status: COMPLETED | OUTPATIENT
Start: 2025-07-20 | End: 2025-07-20

## 2025-07-20 RX ORDER — KETOROLAC TROMETHAMINE 30 MG/ML
30 INJECTION, SOLUTION INTRAMUSCULAR; INTRAVENOUS
Status: DISCONTINUED | OUTPATIENT
Start: 2025-07-20 | End: 2025-07-20

## 2025-07-20 RX ORDER — KETOROLAC TROMETHAMINE 30 MG/ML
30 INJECTION, SOLUTION INTRAMUSCULAR; INTRAVENOUS ONCE
Status: COMPLETED | OUTPATIENT
Start: 2025-07-20 | End: 2025-07-20

## 2025-07-20 RX ADMIN — BUTALBITAL, ACETAMINOPHEN, AND CAFFEINE 1 TABLET: 325; 50; 40 TABLET ORAL at 10:36

## 2025-07-20 RX ADMIN — ONDANSETRON 4 MG: 4 TABLET, ORALLY DISINTEGRATING ORAL at 10:37

## 2025-07-20 RX ADMIN — KETOROLAC TROMETHAMINE 30 MG: 30 INJECTION, SOLUTION INTRAMUSCULAR at 10:40

## 2025-07-20 ASSESSMENT — PAIN SCALES - GENERAL
PAINLEVEL_OUTOF10: 8
PAINLEVEL_OUTOF10: 6
PAINLEVEL_OUTOF10: 10
PAINLEVEL_OUTOF10: 10

## 2025-07-20 ASSESSMENT — LIFESTYLE VARIABLES
HOW OFTEN DO YOU HAVE A DRINK CONTAINING ALCOHOL: MONTHLY OR LESS
HOW MANY STANDARD DRINKS CONTAINING ALCOHOL DO YOU HAVE ON A TYPICAL DAY: 1 OR 2

## 2025-07-20 ASSESSMENT — PAIN - FUNCTIONAL ASSESSMENT: PAIN_FUNCTIONAL_ASSESSMENT: 0-10

## 2025-07-20 ASSESSMENT — PAIN DESCRIPTION - LOCATION: LOCATION: HEAD

## 2025-07-20 NOTE — ED TRIAGE NOTES
Pt CO headache that started all over head, but is now on the R side of her head. Pt states migraine started at approx 0530 this morning. Hx of migraines.     Pt advises that Fioricet usually relieves migraine, but not helping this episode. Pt endorses light sensitivity.

## 2025-07-20 NOTE — ED PROVIDER NOTES
Medical History:   Diagnosis Date    Abnormal Papanicolaou smear of cervix     Anemia     GERD (gastroesophageal reflux disease)     severe    Headache     Morbid obesity (HCC)         Past Surgical History:   Procedure Laterality Date    [place] UNLISTED PROCEDURE ABDOMEN PERITONEUM & OMENTUM      gallbladder rx    WISDOM TOOTH EXTRACTION          Social History     Socioeconomic History    Marital status: Single     Spouse name: None    Number of children: None    Years of education: None    Highest education level: None   Tobacco Use    Smoking status: Never    Smokeless tobacco: Never   Vaping Use    Vaping status: Never Used   Substance and Sexual Activity    Alcohol use: Yes     Alcohol/week: 2.0 standard drinks of alcohol     Types: 1 Glasses of wine, 1 Drinks containing 0.5 oz of alcohol per week     Comment: Half shots / half glass    Drug use: No    Sexual activity: Yes     Partners: Male     Birth control/protection: Condom     Social Drivers of Health     Financial Resource Strain: Low Risk  (3/12/2025)    Received from [facility]    Financial Resource Strain     Difficulty Paying Living Expenses: Not hard at all     Difficulty Paying Medical Expenses: No   Food Insecurity: No Food Insecurity (3/12/2025)    Received from [facility]    Food Insecurity     Worried about Running Out of Food in the Last Year: Never true     Ran Out of Food in the Last Year: Never true   Transportation Needs: No Transportation Needs (3/12/2025)    Received from [facility]    Transportation Needs     Lack of Transportation: No   Physical Activity: Insufficiently Active (7/12/2024)    Received from [facility]    Physical Activity     Days of Exercise per Week: 2 days     On average, how many minutes do you exercise per day?: 20     Total Minutes of Exercise Per Week: 40   Stress: No Stress Concern Present (3/12/2025)    Received from [facility]    Stress     Feeling of Stress : Not at all   Social Connections:

## 2025-07-20 NOTE — DISCHARGE INSTRUCTIONS
Continue at home medications.  Rest push fluids return to ER for any fever vomiting or worsening symptoms call your primary care physician tomorrow to arrange follow-up appoint

## (undated) DEVICE — KENDALL RADIOLUCENT FOAM MONITORING ELECTRODE RECTANGULAR SHAPE: Brand: KENDALL

## (undated) DEVICE — FORCEPS BX L240CM JAW DIA2.8MM L CAP W/ NDL MIC MESH TOOTH

## (undated) DEVICE — CONNECTOR TBNG OD5-7MM O2 END DISP

## (undated) DEVICE — CONTAINER PREFIL FRMLN 40ML --

## (undated) DEVICE — CANNULA NSL ORAL AD FOR CAPNOFLEX CO2 O2 AIRLFE

## (undated) DEVICE — BLOCK BITE AD 60FR W/ VELC STRP ADDRESSES MOST PT AND